# Patient Record
Sex: FEMALE | Race: WHITE | URBAN - METROPOLITAN AREA
[De-identification: names, ages, dates, MRNs, and addresses within clinical notes are randomized per-mention and may not be internally consistent; named-entity substitution may affect disease eponyms.]

---

## 2017-01-17 ENCOUNTER — OFFICE VISIT (OUTPATIENT)
Dept: CARDIOLOGY CLINIC | Age: 72
End: 2017-01-17

## 2017-01-17 VITALS
DIASTOLIC BLOOD PRESSURE: 44 MMHG | HEART RATE: 69 BPM | HEIGHT: 63 IN | BODY MASS INDEX: 34.2 KG/M2 | SYSTOLIC BLOOD PRESSURE: 118 MMHG | WEIGHT: 193 LBS

## 2017-01-17 DIAGNOSIS — R68.84 JAW PAIN: Primary | ICD-10-CM

## 2017-01-17 DIAGNOSIS — E78.5 HYPERLIPIDEMIA, UNSPECIFIED HYPERLIPIDEMIA TYPE: ICD-10-CM

## 2017-01-17 DIAGNOSIS — I20.8 ATYPICAL ANGINA (HCC): ICD-10-CM

## 2017-01-17 NOTE — PROGRESS NOTES
HISTORY OF PRESENT ILLNESS  Jw Rodriguez is a 66 y.o. female. New Patient   The history is provided by the patient. Associated symptoms include chest pain. Pertinent negatives include no abdominal pain, no headaches and no shortness of breath. Chest Pain    The history is provided by the patient. This is a new problem. The problem has not changed since onset. The problem occurs rarely. The pain is associated with rest, exertion and movement. The pain is present in the right side (back). The quality of the pain is described as dull. The pain radiates to the right jaw. Pertinent negatives include no abdominal pain, no claudication, no cough, no dizziness, no exertional chest pressure, no fever, no headaches, no hemoptysis, no nausea, no near-syncope, no orthopnea, no palpitations, no PND, no shortness of breath, no sputum production, no vomiting and no weakness. Jaw Pain   The history is provided by the patient. This is a recurrent problem. The problem occurs rarely. The problem has been resolved. Associated symptoms include chest pain. Pertinent negatives include no abdominal pain, no headaches and no shortness of breath. Nothing aggravates the symptoms. Nothing relieves the symptoms. Review of Systems   Constitutional: Negative for chills and fever. HENT: Negative for nosebleeds. Eyes: Negative for blurred vision and double vision. Respiratory: Negative for cough, hemoptysis, sputum production, shortness of breath and wheezing. Cardiovascular: Positive for chest pain. Negative for palpitations, orthopnea, claudication, leg swelling, PND and near-syncope. Gastrointestinal: Negative for abdominal pain, heartburn, nausea and vomiting. Musculoskeletal: Negative for myalgias. Skin: Negative for rash. Neurological: Negative for dizziness, weakness and headaches. Endo/Heme/Allergies: Does not bruise/bleed easily.      Family History   Problem Relation Age of Onset    Stroke Mother 80    Stroke Maternal Grandmother 95       Past Medical History   Diagnosis Date    Hyperlipidemia     Thyroid disease        History reviewed. No pertinent past surgical history. Social History   Substance Use Topics    Smoking status: Never Smoker    Smokeless tobacco: Not on file    Alcohol use Yes       Allergies   Allergen Reactions    Penicillins Rash       Prior to Admission medications    Medication Sig Start Date End Date Taking? Authorizing Provider   FENOFIBRATE NANOCRYSTALLIZED (TRICOR PO) Take  by mouth. Yes Historical Provider   LEVOTHYROXINE SODIUM (LEVOTHYROXINE PO) Take  by mouth. Yes Historical Provider         Visit Vitals    /44    Pulse 69    Ht 5' 3\" (1.6 m)    Wt 87.5 kg (193 lb)    BMI 34.19 kg/m2         Physical Exam   Constitutional: She is oriented to person, place, and time. She appears well-developed and well-nourished. HENT:   Head: Normocephalic and atraumatic. Eyes: Conjunctivae are normal.   Neck: Neck supple. No JVD present. No tracheal deviation present. No thyromegaly present. Cardiovascular: Normal rate and regular rhythm. PMI is not displaced. Exam reveals no gallop, no S3 and no decreased pulses. No murmur heard. Pulmonary/Chest: No respiratory distress. She has no wheezes. She has no rales. She exhibits no tenderness. Abdominal: Soft. There is no tenderness. Musculoskeletal: She exhibits no edema. Neurological: She is alert and oriented to person, place, and time. Skin: Skin is warm. Psychiatric: She has a normal mood and affect. Ms. Francesca Long has a reminder for a \"due or due soon\" health maintenance. I have asked that she contact her primary care provider for follow-up on this health maintenance. No flowsheet data found. Assessment         ICD-10-CM ICD-9-CM    1. Jaw pain R68.84 784.92 SCHEDULE NUCLEAR STUDY    recent possible angina   2. Atypical angina ([facility] [address] I20.8 413.9 SCHEDULE NUCLEAR STUDY    new onset     3. Hyperlipidemia, unspecified hyperlipidemia type E78.5 272.4     on treatment       There are no discontinued medications. Orders Placed This Encounter    SCHEDULE NUCLEAR STUDY     Exercise stress test     Standing Status:   Future     Standing Expiration Date:   1/17/2018       Follow-up Disposition:  Return for F/u after tests.

## 2017-01-17 NOTE — MR AVS SNAPSHOT
Visit Information Date & Time Provider Department Dept. Phone Encounter #  
 1/17/2017  9:15 AM Padma Ng MD [facility] [place] [phone] [id number] Follow-up Instructions Return for F/u after tests. Your Appointments 1/19/2017  7:30 AM  
PROCEDURE with [place] NUC [facility] [place] [facility] Appt Note: [address] [place] [address]  
  
   
 [address] [facility]  
  
    
 2/2/2017  9:00 AM  
Follow Up with Padma Ng MD  
[facility] [place] ([facility]) Appt Note: Post nuclear follow up  
 [address] [place] [address]  
  
   
 [address] [place] [id number] Upcoming Health Maintenance Date Due DTaP/Tdap/Td series (1 - Tdap) 1/21/1965 BREAST CANCER SCRN MAMMOGRAM 1/21/1994 FOBT Q 1 YEAR AGE 50-75 1/21/1994 ZOSTER VACCINE AGE 60> 1/21/2004 GLAUCOMA SCREENING Q2Y 1/21/2009 OSTEOPOROSIS SCREENING (DEXA) 1/21/2009 Pneumococcal 65+ Low/Medium Risk (1 of 2 - PCV13) 1/21/2009 MEDICARE YEARLY EXAM 1/21/2009 INFLUENZA AGE 9 TO ADULT 8/1/2016 Allergies as of 1/17/2017  Review Complete On: 1/17/2017 By: Padma Ng MD  
  
 Severity Noted Reaction Type Reactions Penicillins  01/17/2017    Rash Current Immunizations  Never Reviewed No immunizations on file. Not reviewed this visit You Were Diagnosed With   
  
 Codes Comments Jaw pain    -  Primary ICD-10-CM: R68.84 ICD-9-CM: 784.92 recent possible angina Atypical angina (HCC)     ICD-10-CM: I20.8 ICD-9-CM: 413.9 new onset Hyperlipidemia, unspecified hyperlipidemia type     ICD-10-CM: E78.5 ICD-9-CM: 272.4 on treatment Vitals BP Pulse Height(growth percentile) Weight(growth percentile) BMI Smoking Status 118/44 69 5' 3\" (1.6 m) 193 lb (87.5 kg) 34.19 kg/m2 Never Smoker Vitals History BMI and BSA Data Body Mass Index Body Surface Area  
 34.19 kg/m 2 1.97 m 2 Your Updated Medication List  
  
   
This list is accurate as of: 1/17/17  9:24 AM.  Always use your most recent med list.  
  
  
  
  
 LEVOTHYROXINE PO Take  by mouth. TRICOR PO Take  by mouth. Follow-up Instructions Return for F/u after tests. To-Do List   
 01/24/2017 Nursing:  SCHEDULE NUCLEAR STUDY Introducing [facility] & HEALTH SERVICES! Donna Cole introduces ClickOn patient portal. Now you can access parts of your medical record, email your doctor's office, and request medication refills online. 1. In your internet browser, go to [email or web address] 2. Click on the First Time User? Click Here link in the Sign In box. You will see the New Member Sign Up page. 3. Enter your ClickOn Access Code exactly as it appears below. You will not need to use this code after youve completed the sign-up process. If you do not sign up before the expiration date, you must request a new code. · ClickOn Access Code: [email or web address] Expires: 4/17/2017  8:42 AM 
 
4. Enter the last four digits of your Social Security Number (xxxx) and Date of Birth (mm/dd/yyyy) as indicated and click Submit. You will be taken to the next sign-up page. 5. Create a ClickOn ID. This will be your ClickOn login ID and cannot be changed, so think of one that is secure and easy to remember. 6. Create a ClickOn password. You can change your password at any time. 7. Enter your Password Reset Question and Answer. This can be used at a later time if you forget your password. 8. Enter your e-mail address. You will receive e-mail notification when new information is available in [address] 9. Click Sign Up. You can now view and download portions of your medical record. 10. Click the Download Summary menu link to download a portable copy of your medical information. If you have questions, please visit the Frequently Asked Questions section of the [facility] website. Remember, [facility] is NOT to be used for urgent needs. For medical emergencies, dial 911. Now available from your iPhone and Android! Please provide this summary of care documentation to your next provider. Your primary care clinician is listed as [address] [place] [address]. If you have any questions after today's visit, please call [phone].

## 2017-01-17 NOTE — LETTER
Palmira Ugarte 1945 1/17/2017 Dear Daniela Huizar NP I had the pleasure of evaluating  Ms. Zara Primrose in office today. Below are the relevant portions of my assessment and plan of care. ICD-10-CM ICD-9-CM 1. Jaw pain R68.84 784.92 SCHEDULE NUCLEAR STUDY  
 recent possible angina 2. Atypical angina (HCC) I20.8 413.9 SCHEDULE NUCLEAR STUDY  
 new onset 3. Hyperlipidemia, unspecified hyperlipidemia type E78.5 272.4   
 on treatment Current Outpatient Prescriptions Medication Sig Dispense Refill  FENOFIBRATE NANOCRYSTALLIZED (TRICOR PO) Take  by mouth.  LEVOTHYROXINE SODIUM (LEVOTHYROXINE PO) Take  by mouth. Orders Placed This Encounter  SCHEDULE NUCLEAR STUDY Exercise stress test  
  Standing Status:   Future Standing Expiration Date:   1/17/2018  FENOFIBRATE NANOCRYSTALLIZED (TRICOR PO) Sig: Take  by mouth.  LEVOTHYROXINE SODIUM (LEVOTHYROXINE PO) Sig: Take  by mouth. If you have questions, please do not hesitate to call me. I look forward to following Ms. Zara Primrose along with you. Sincerely, Yuval Underwood MD

## 2017-01-19 ENCOUNTER — CLINICAL SUPPORT (OUTPATIENT)
Dept: CARDIOLOGY CLINIC | Age: 72
End: 2017-01-19

## 2017-01-19 DIAGNOSIS — I20.8 ATYPICAL ANGINA (HCC): ICD-10-CM

## 2017-01-19 DIAGNOSIS — R68.84 JAW PAIN: Primary | ICD-10-CM

## 2017-01-19 DIAGNOSIS — E78.5 HYPERLIPIDEMIA, UNSPECIFIED HYPERLIPIDEMIA TYPE: ICD-10-CM

## 2017-01-19 NOTE — PROGRESS NOTES
[facility]  [address], [address] [place] [address], [place], [address] [place] [address]  [phone] [phone]      Name: Kitty Caldera         MRN#: [id number]      Gender: female       YOB: 1945             Date of Rest/Stress Images: 1/19/2017   Referring Provider: Lizette Jones NP  Ordering Provider: Chen Caldera. Harry Mariscal MD, [facility]  Technologist: Karmen Aguilera. Shamar RESTREPO, C.N.M.T. Diagnosis:   1. Jaw pain    2. Atypical angina ([address]    3. Hyperlipidemia, unspecified hyperlipidemia type            Rest/Stress Myoview SPECT Myocardial Perfusion Imaging with  Exercise Stress and Gated SPECT Imaging      PROCEDURE:      Myocardial perfusion imaging was performed at rest approximately 30 mins following the intravenous injection,(Right hand ) of 11.7 mCi of Tc99m Myoview for evaluation of myocardial function and perfusion at rest.     Baseline Data:    Baseline EKG reveals sinus rhythm, possible old anterior MI. Baseline heart rate is 61. Baseline blood pressure is 138/82. Procedure: The patient was exercised using the standard Edward protocol for 5 minutes, 15 seconds. Exercise was stopped because of fatigue. The patient had no chest pain. Heart rate increased from baseline to a heart rate of 142, achieving 95% of the maximum predicted heart rate. Blood pressure response was appropriate. Peak blood pressure was 152/90. Electrocardiogram showed no significant ST-T changes or arrhythmia during the procedure. Diagnosis:   1. Negative EKG portion of exercise stress test at 95% of the maximum predicted heart rate. 2. Reduced functional tolerance. 3. No symptoms of angina. 4. Nuclear imaging report to follow. Exercise: At peak exercise, the patient was injected intravenously with 37.0 mCi of Tc99m Myoview and exercise was continued for 15 to 45 seconds.  The stress images were obtained approximately 30 minutes post tracer injection. The stress SPECT study was gated to evaluate regional wall motion and calculate the left ventricular ejection fraction. The data was reconstructed in the short, horizontal long and vertical long axis views and tomographic slices were generated. NUCLEAR IMAGING:     Findings:   1. Stress images reveal normal Myoview distrubution in all the LV segments in short axis, vertical and horizontal long axis views. 2. Resting images have a normal uptake. 3. Gated images reveal normal wall motion and the ejection fraction is calculated to be 90%. Conclusion:   1. Normal perfusion scan. 2. Normal wall motion and ejection fraction. 3. Low risk scan. Thank you for the referral.    E-signed and Interpreting Physician:    Fercho Colon.  Tanja Dougherty MD, [facility]     Date of interpretation: 1/19/2017  Date of final report: 1/19/2017

## 2017-02-14 ENCOUNTER — OFFICE VISIT (OUTPATIENT)
Dept: CARDIOLOGY CLINIC | Age: 72
End: 2017-02-14

## 2017-02-14 VITALS
HEART RATE: 75 BPM | HEIGHT: 63 IN | SYSTOLIC BLOOD PRESSURE: 122 MMHG | DIASTOLIC BLOOD PRESSURE: 71 MMHG | WEIGHT: 192 LBS | BODY MASS INDEX: 34.02 KG/M2

## 2017-02-14 DIAGNOSIS — R68.84 JAW PAIN: Primary | ICD-10-CM

## 2017-02-14 DIAGNOSIS — I20.8 ATYPICAL ANGINA (HCC): ICD-10-CM

## 2017-02-14 NOTE — PROGRESS NOTES
1. Have you been to the ER, urgent care clinic since your last visit? Hospitalized since your last visit? No    2. Have you seen or consulted any other health care providers outside of the [address] [place] [address] since your last visit? Include any pap smears or colon screening. No     3. Since your last visit, have you had any of the following symptoms? .           4. Have you had any blood work, X-rays or cardiac testing? No              5.  Where do you normally have your labs drawn? PCP Office    6. Do you need any refills today?    No

## 2017-02-14 NOTE — LETTER
Irlanda Pierre 1945 2/14/2017 Dear Mary Manrique NP I had the pleasure of evaluating  Ms. Marcia Caal in office today. Below are the relevant portions of my assessment and plan of care. ICD-10-CM ICD-9-CM 1. Jaw pain R68.84 784.92   
 unclear etiology 2. 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   
 negative est 
monitor clinically Current Outpatient Prescriptions Medication Sig Dispense Refill  FENOFIBRATE NANOCRYSTALLIZED (TRICOR PO) Take  by mouth.  LEVOTHYROXINE SODIUM (LEVOTHYROXINE PO) Take  by mouth. No orders of the defined types were placed in this encounter. If you have questions, please do not hesitate to call me. I look forward to following Ms. Marcia Caal along with you. Sincerely, Evelyn Hackett MD

## 2017-02-14 NOTE — MR AVS SNAPSHOT
Visit Information Date & Time Provider Department Dept. Phone Encounter #  
 2/14/2017  1:30 PM Teetee Avila MD [facility] [phone] [id number] Follow-up Instructions Return in about 1 year (around 2/14/2018). Upcoming Health Maintenance Date Due DTaP/Tdap/Td series (1 - Tdap) 1/21/1965 BREAST CANCER SCRN MAMMOGRAM 1/21/1994 FOBT Q 1 YEAR AGE 50-75 1/21/1994 ZOSTER VACCINE AGE 60> 1/21/2004 GLAUCOMA SCREENING Q2Y 1/21/2009 OSTEOPOROSIS SCREENING (DEXA) 1/21/2009 Pneumococcal 65+ Low/Medium Risk (1 of 2 - PCV13) 1/21/2009 MEDICARE YEARLY EXAM 1/21/2009 INFLUENZA AGE 9 TO ADULT 8/1/2016 Allergies as of 2/14/2017  Review Complete On: 2/14/2017 By: Teetee Avila MD  
  
 Severity Noted Reaction Type Reactions Penicillins  01/17/2017    Rash Current Immunizations  Never Reviewed No immunizations on file. Not reviewed this visit You Were Diagnosed With   
  
 Codes Comments Jaw pain    -  Primary ICD-10-CM: R68.84 ICD-9-CM: 784.92 unclear etiology Atypical angina (HCC)     ICD-10-CM: I20.8 ICD-9-CM: 413.9 negative est 
monitor clinically Vitals BP Pulse Height(growth percentile) Weight(growth percentile) BMI Smoking Status 122/71 75 5' 3\" (1.6 m) 192 lb (87.1 kg) 34.01 kg/m2 Never Smoker Vitals History BMI and BSA Data Body Mass Index Body Surface Area 34.01 kg/m 2 1.97 m 2 Your Updated Medication List  
  
   
This list is accurate as of: 2/14/17  2:01 PM.  Always use your most recent med list.  
  
  
  
  
 LEVOTHYROXINE PO Take  by mouth. TRICOR PO Take  by mouth. Follow-up Instructions Return in about 1 year (around 2/14/2018). Introducing [facility] & HEALTH SERVICES! Dear Herbert Zarate: 
Thank you for requesting a bluebird bio account. Our records indicate that you already have an active bluebird bio account.   You can access your account anytime at [email or web address] Did you know that you can access your hospital and ER discharge instructions at any time in [place]? You can also review all of your test results from your hospital stay or ER visit. Additional Information If you have questions, please visit the Frequently Asked Questions section of the [facility] website at [email or web address] Remember, [facility] is NOT to be used for urgent needs. For medical emergencies, dial 911. Now available from your iPhone and Android! Please provide this summary of care documentation to your next provider. Your primary care clinician is listed as [address] [place] [address]. If you have any questions after today's visit, please call [phone].

## 2017-02-14 NOTE — PROGRESS NOTES
HISTORY OF PRESENT ILLNESS  Sinai Nicole is a 67 y.o. female. HPI Comments: Patient is here for follow up of diagnostic tests. Results will be discussed. Chest Pain (Angina)    The history is provided by the patient. This is a new problem. The problem has not changed since onset. The problem occurs rarely. The pain is associated with rest, exertion and movement. The pain is present in the right side (back). The quality of the pain is described as dull. The pain radiates to the right jaw. Pertinent negatives include no abdominal pain, no claudication, no cough, no dizziness, no exertional chest pressure, no fever, no headaches, no hemoptysis, no nausea, no near-syncope, no orthopnea, no palpitations, no PND, no shortness of breath, no sputum production, no vomiting and no weakness. Jaw Pain   The history is provided by the patient. This is a recurrent problem. The problem occurs rarely. The problem has been resolved. Associated symptoms include chest pain. Pertinent negatives include no abdominal pain, no headaches and no shortness of breath. Nothing aggravates the symptoms. Nothing relieves the symptoms. Review of Systems   Constitutional: Negative for chills and fever. HENT: Negative for nosebleeds. Eyes: Negative for blurred vision and double vision. Respiratory: Negative for cough, hemoptysis, sputum production, shortness of breath and wheezing. Cardiovascular: Positive for chest pain. Negative for palpitations, orthopnea, claudication, leg swelling, PND and near-syncope. Gastrointestinal: Negative for abdominal pain, heartburn, nausea and vomiting. Musculoskeletal: Negative for myalgias. Skin: Negative for rash. Neurological: Negative for dizziness, weakness and headaches. Endo/Heme/Allergies: Does not bruise/bleed easily.      Family History   Problem Relation Age of Onset    Stroke Mother 80    Stroke Maternal Grandmother 80       Past Medical History   Diagnosis Date    Hyperlipidemia     Thyroid disease        No past surgical history on file. Social History   Substance Use Topics    Smoking status: Never Smoker    Smokeless tobacco: Not on file    Alcohol use Yes       Allergies   Allergen Reactions    Penicillins Rash       Prior to Admission medications    Medication Sig Start Date End Date Taking? Authorizing Provider   FENOFIBRATE NANOCRYSTALLIZED (TRICOR PO) Take  by mouth. Yes Historical Provider   LEVOTHYROXINE SODIUM (LEVOTHYROXINE PO) Take  by mouth. Yes Historical Provider         Visit Vitals    /71    Pulse 75    Ht 5' 3\" (1.6 m)    Wt 87.1 kg (192 lb)    BMI 34.01 kg/m2         Physical Exam   Constitutional: She is oriented to person, place, and time. She appears well-developed and well-nourished. HENT:   Head: Normocephalic and atraumatic. Eyes: Conjunctivae are normal.   Neck: Neck supple. No JVD present. No tracheal deviation present. No thyromegaly present. Cardiovascular: Normal rate and regular rhythm. PMI is not displaced. Exam reveals no gallop, no S3 and no decreased pulses. No murmur heard. Pulmonary/Chest: No respiratory distress. She has no wheezes. She has no rales. She exhibits no tenderness. Abdominal: Soft. There is no tenderness. Musculoskeletal: She exhibits no edema. Neurological: She is alert and oriented to person, place, and time. Skin: Skin is warm. Psychiatric: She has a normal mood and affect. Ms. Lucendia Frankel has a reminder for a \"due or due soon\" health maintenance. I have asked that she contact her primary care provider for follow-up on this health maintenance. NUCLEAR IMAGIN2017     Findings:   1. Stress images reveal normal Myoview distrubution in all the LV segments in short axis, vertical and horizontal long axis views. 2. Resting images have a normal uptake. 3. Gated images reveal normal wall motion and the ejection fraction is calculated to be 90%. Conclusion:   1.  Normal perfusion scan. 2. Normal wall motion and ejection fraction. 3. Low risk scan. Assessment         ICD-10-CM ICD-9-CM    1. Jaw pain R68.84 784.92     unclear etiology   2. Atypical angina (HCC) I20.8 413.9     negative est  monitor clinically       There are no discontinued medications. No orders of the defined types were placed in this encounter. Follow-up Disposition:  Return in about 1 year (around 2/14/2018).

## 2018-02-15 ENCOUNTER — OFFICE VISIT (OUTPATIENT)
Dept: CARDIOLOGY CLINIC | Age: 73
End: 2018-02-15

## 2018-02-15 VITALS
HEIGHT: 63 IN | DIASTOLIC BLOOD PRESSURE: 70 MMHG | WEIGHT: 192 LBS | BODY MASS INDEX: 34.02 KG/M2 | HEART RATE: 67 BPM | SYSTOLIC BLOOD PRESSURE: 123 MMHG

## 2018-02-15 DIAGNOSIS — E78.5 HYPERLIPIDEMIA, UNSPECIFIED HYPERLIPIDEMIA TYPE: ICD-10-CM

## 2018-02-15 DIAGNOSIS — I20.8 ATYPICAL ANGINA (HCC): Primary | ICD-10-CM

## 2018-02-15 DIAGNOSIS — E03.9 ACQUIRED HYPOTHYROIDISM: ICD-10-CM

## 2018-02-15 NOTE — PROGRESS NOTES
1. Have you been to the ER, urgent care clinic since your last visit? Hospitalized since your last visit? No    2. Have you seen or consulted any other health care providers outside of the [address] [place] [address] since your last visit? Include any pap smears or colon screening.  Yes, pcp

## 2018-02-15 NOTE — MR AVS SNAPSHOT
[address] [facility] 
 
 
 [address] [facility] 
[phone] Patient: Ruby Ellis MRN: [id number] YYT:[birth year] Visit Information Date & Time Provider Department Dept. Phone Encounter #  
 2/15/2018 11:00 AM Jefferson Cameron MD [facility] [place] [phone] Follow-up Instructions Return in about 1 year (around 2/15/2019). Upcoming Health Maintenance Date Due DTaP/Tdap/Td series (1 - Tdap) 1/21/1965 BREAST CANCER SCRN MAMMOGRAM 1/21/1994 FOBT Q 1 YEAR AGE 50-75 1/21/1994 ZOSTER VACCINE AGE 60> 11/21/2003 GLAUCOMA SCREENING Q2Y 1/21/2009 OSTEOPOROSIS SCREENING (DEXA) 1/21/2009 Pneumococcal 65+ Low/Medium Risk (1 of 2 - PCV13) 1/21/2009 MEDICARE YEARLY EXAM 1/21/2009 Influenza Age 5 to Adult 8/1/2017 Allergies as of 2/15/2018  Review Complete On: 2/15/2018 By: Jefferson Cameron MD  
  
 Severity Noted Reaction Type Reactions Penicillins  01/17/2017    Rash Current Immunizations  Never Reviewed No immunizations on file. Not reviewed this visit You Were Diagnosed With   
  
 Codes Comments Atypical angina (HCC)    -  Primary ICD-10-CM: I20.8 ICD-9-CM: 413.9 stable 
no angina 
exertional sob stable Hyperlipidemia, unspecified hyperlipidemia type     ICD-10-CM: E78.5 ICD-9-CM: 272.4 on fibrate 
lipids with pcp Acquired hypothyroidism     ICD-10-CM: E03.9 ICD-9-CM: 244.9 on synthroid Vitals BP Pulse Height(growth percentile) Weight(growth percentile) BMI OB Status 123/70 67 5' 3\" (1.6 m) 192 lb (87.1 kg) 34.01 kg/m2 Menopause Smoking Status Never Smoker Vitals History BMI and BSA Data Body Mass Index Body Surface Area 34.01 kg/m 2 1.97 m 2 Your Updated Medication List  
  
   
This list is accurate as of: 2/15/18 11:13 AM.  Always use your most recent med list.  
  
  
  
  
 LEVOTHYROXINE PO  
 Take  by mouth. TRICOR PO Take  by mouth. Follow-up Instructions Return in about 1 year (around 2/15/2019). Introducing [facility]! Dear Betsy Fulton: 
Thank you for requesting a [employer] account. Our records indicate that you already have an active [employer] account. You can access your account anytime at [email or web address] Did you know that you can access your hospital and ER discharge instructions at any time in [place]? You can also review all of your test results from your hospital stay or ER visit. Additional Information If you have questions, please visit the Frequently Asked Questions section of the [employer] website at [email or web address] Remember, [facility] is NOT to be used for urgent needs. For medical emergencies, dial 911. Now available from your iPhone and Android! Please provide this summary of care documentation to your next provider. Your primary care clinician is listed as Carolyn Funk. If you have any questions after today's visit, please call [phone].

## 2018-02-15 NOTE — PROGRESS NOTES
HISTORY OF PRESENT ILLNESS  Abby Browning is a 75 y.o. female. Cholesterol Problem   The history is provided by the patient. This is a chronic problem. The problem occurs constantly. The problem has not changed since onset. Pertinent negatives include no chest pain, no abdominal pain, no headaches and no shortness of breath. Chest Pain (Angina)    The history is provided by the patient. This is a new problem. The problem has been resolved. The problem occurs rarely. The pain is present in the right side (back). The quality of the pain is described as dull. The pain radiates to the right jaw. Pertinent negatives include no abdominal pain, no claudication, no cough, no dizziness, no exertional chest pressure, no fever, no headaches, no hemoptysis, no nausea, no near-syncope, no orthopnea, no palpitations, no PND, no shortness of breath, no sputum production, no vomiting and no weakness. Jaw Pain   The history is provided by the patient. This is a recurrent problem. The problem occurs rarely. The problem has been resolved. Pertinent negatives include no chest pain, no abdominal pain, no headaches and no shortness of breath. Nothing aggravates the symptoms. Nothing relieves the symptoms. Review of Systems   Constitutional: Negative for chills and fever. HENT: Negative for nosebleeds. Eyes: Negative for blurred vision and double vision. Respiratory: Negative for cough, hemoptysis, sputum production, shortness of breath and wheezing. Cardiovascular: Negative for chest pain, palpitations, orthopnea, claudication, leg swelling, PND and near-syncope. Gastrointestinal: Negative for abdominal pain, heartburn, nausea and vomiting. Musculoskeletal: Negative for myalgias. Skin: Negative for rash. Neurological: Negative for dizziness, weakness and headaches. Endo/Heme/Allergies: Does not bruise/bleed easily.      Family History   Problem Relation Age of Onset    Stroke Mother 80    Stroke Maternal Grandmother 95       Past Medical History:   Diagnosis Date    Hyperlipidemia     Thyroid disease        No past surgical history on file. Social History   Substance Use Topics    Smoking status: Never Smoker    Smokeless tobacco: Never Used    Alcohol use No       Allergies   Allergen Reactions    Penicillins Rash       Prior to Admission medications    Medication Sig Start Date End Date Taking? Authorizing Provider   FENOFIBRATE NANOCRYSTALLIZED (TRICOR PO) Take  by mouth. Yes Historical Provider   LEVOTHYROXINE SODIUM (LEVOTHYROXINE PO) Take  by mouth. Yes Historical Provider         Visit Vitals    /70    Pulse 67    Ht 5' 3\" (1.6 m)    Wt 87.1 kg (192 lb)    BMI 34.01 kg/m2         Physical Exam   Constitutional: She is oriented to person, place, and time. She appears well-developed and well-nourished. HENT:   Head: Normocephalic and atraumatic. Eyes: Conjunctivae are normal.   Neck: Neck supple. No JVD present. No tracheal deviation present. No thyromegaly present. Cardiovascular: Normal rate and regular rhythm. PMI is not displaced. Exam reveals no gallop, no S3 and no decreased pulses. No murmur heard. Pulmonary/Chest: No respiratory distress. She has no wheezes. She has no rales. She exhibits no tenderness. Abdominal: Soft. There is no tenderness. Musculoskeletal: She exhibits no edema. Neurological: She is alert and oriented to person, place, and time. Skin: Skin is warm. Psychiatric: She has a normal mood and affect. Ms. Kayley Bay has a reminder for a \"due or due soon\" health maintenance. I have asked that she contact her primary care provider for follow-up on this health maintenance. NUCLEAR [id number]     Findings:   1. Stress images reveal normal Myoview distrubution in all the LV segments in short axis, vertical and horizontal long axis views. 2. Resting images have a normal uptake.    3. Gated images reveal normal wall motion and the ejection fraction is calculated to be 90%. Conclusion:   1. Normal perfusion scan. 2. Normal wall motion and ejection fraction. 3. Low risk scan. I Have personally reviewed recent relevant labs available and discussed with patient  1/2018    Assessment         ICD-10-CM ICD-9-CM    1. 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 Hyperlipidemia, unspecified hyperlipidemia type E78.5 272.4     on fibrate  lipids with pcp   3. Acquired hypothyroidism E03.9 244.9     on synthroid       There are no discontinued medications. No orders of the defined types were placed in this encounter. Follow-up Disposition:  Return in about 1 year (around 2/15/2019).

## 2018-02-15 NOTE — LETTER
Tamiko Field 1945 2/15/2018 Dear Jake Ybarra, NP I had the pleasure of evaluating  Ms. Geraldine Montenegro in office today. Below are the relevant portions of my assessment and plan of care. ICD-10-CM ICD-9-CM 1. 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   
 stable 
no angina 
exertional sob stable 2. Hyperlipidemia, unspecified hyperlipidemia type E78.5 272.4   
 on fibrate 
lipids with pcp 3. Acquired hypothyroidism E03.9 244.9   
 on synthroid Current Outpatient Prescriptions Medication Sig Dispense Refill  FENOFIBRATE NANOCRYSTALLIZED (TRICOR PO) Take  by mouth.  LEVOTHYROXINE SODIUM (LEVOTHYROXINE PO) Take  by mouth. No orders of the defined types were placed in this encounter. If you have questions, please do not hesitate to call me. I look forward to following Ms. Geraldine Montenegro along with you. Sincerely, Jesus Zhang MD

## 2019-02-11 ENCOUNTER — OFFICE VISIT (OUTPATIENT)
Dept: CARDIOLOGY CLINIC | Age: 74
End: 2019-02-11

## 2019-02-11 VITALS
SYSTOLIC BLOOD PRESSURE: 120 MMHG | HEIGHT: 63 IN | BODY MASS INDEX: 33.31 KG/M2 | DIASTOLIC BLOOD PRESSURE: 74 MMHG | HEART RATE: 91 BPM | WEIGHT: 188 LBS

## 2019-02-11 DIAGNOSIS — E78.5 HYPERLIPIDEMIA, UNSPECIFIED HYPERLIPIDEMIA TYPE: ICD-10-CM

## 2019-02-11 DIAGNOSIS — I20.8 ATYPICAL ANGINA (HCC): Primary | ICD-10-CM

## 2019-02-11 DIAGNOSIS — E03.9 ACQUIRED HYPOTHYROIDISM: ICD-10-CM

## 2019-02-11 NOTE — PATIENT INSTRUCTIONS

## 2019-02-11 NOTE — LETTER
Kimberly Zuluaga 1945 2/11/2019 Dear Usha Tomlinson NP I had the pleasure of evaluating  Ms. Saulo Hurst in office today. Below are the relevant portions of my assessment and plan of care. ICD-10-CM ICD-9-CM 1. 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 Hyperlipidemia, unspecified hyperlipidemia type E78.5 272.4 Stable - lipids with PCP 3. Acquired hypothyroidism E03.9 244.9 TSH 2.69 - continue synthroid, management with PCP Current Outpatient Medications Medication Sig Dispense Refill  FENOFIBRATE NANOCRYSTALLIZED (TRICOR PO) Take  by mouth.  LEVOTHYROXINE SODIUM (LEVOTHYROXINE PO) Take  by mouth. No orders of the defined types were placed in this encounter. If you have questions, please do not hesitate to call me. I look forward to following Ms. Saulo Hurst along with you. Sincerely, Fadumo Anderson MD

## 2019-02-11 NOTE — PROGRESS NOTES
1. Have you been to the ER, urgent care clinic since your last visit? Hospitalized since your last visit? No     2. Have you seen or consulted any other health care providers outside of the [address] [place] [address] since your last visit? Include any pap smears or colon screening. No     3. Since your last visit, have you had any of the following symptoms? 4. Have you had any blood work, X-rays or cardiac testing? 5. Where do you normally have your labs drawn? 6. Do you need any refills today?

## 2019-02-11 NOTE — PROGRESS NOTES
HISTORY OF PRESENT ILLNESS  Sriram Larry is a 75 y.o. female. Cholesterol Problem   The history is provided by the patient. This is a chronic problem. The problem occurs constantly. The problem has not changed since onset. Pertinent negatives include no chest pain, no abdominal pain, no headaches and no shortness of breath. Chest Pain (Angina)    The history is provided by the patient. This is a new problem. The problem has been resolved. The problem occurs rarely. The pain is present in the right side (back). The quality of the pain is described as dull. The pain radiates to the right jaw. Pertinent negatives include no abdominal pain, no claudication, no cough, no dizziness, no exertional chest pressure, no fever, no headaches, no hemoptysis, no nausea, no near-syncope, no orthopnea, no palpitations, no PND, no shortness of breath, no sputum production, no vomiting and no weakness. Jaw Pain   The history is provided by the patient. This is a recurrent problem. The problem occurs rarely. The problem has been resolved. Pertinent negatives include no chest pain, no abdominal pain, no headaches and no shortness of breath. Nothing aggravates the symptoms. Nothing relieves the symptoms. Review of Systems   Constitutional: Negative for chills and fever. HENT: Negative for nosebleeds. Eyes: Negative for blurred vision and double vision. Respiratory: Negative for cough, hemoptysis, sputum production, shortness of breath and wheezing. Cardiovascular: Negative for chest pain, palpitations, orthopnea, claudication, leg swelling, PND and near-syncope. Gastrointestinal: Negative for abdominal pain, heartburn, nausea and vomiting. Musculoskeletal: Negative for myalgias. Skin: Negative for rash. Neurological: Negative for dizziness, weakness and headaches. Endo/Heme/Allergies: Does not bruise/bleed easily.      Family History   Problem Relation Age of Onset    Stroke Mother 80    Stroke Maternal Grandmother 95       Past Medical History:   Diagnosis Date    Hyperlipidemia     Thyroid disease        No past surgical history on file. Social History     Tobacco Use    Smoking status: Never Smoker    Smokeless tobacco: Never Used   Substance Use Topics    Alcohol use: No       Allergies   Allergen Reactions    Penicillins Rash       Prior to Admission medications    Medication Sig Start Date End Date Taking? Authorizing Provider   FENOFIBRATE NANOCRYSTALLIZED (TRICOR PO) Take  by mouth. Yes Provider, Historical   LEVOTHYROXINE SODIUM (LEVOTHYROXINE PO) Take  by mouth. Yes Provider, Historical         Visit Vitals  /74   Pulse 91   Ht 5' 3\" (1.6 m)   Wt 85.3 kg (188 lb)   BMI 33.30 kg/m²       Cholesterol 132 110 - 200 mg/dL   Triglyceride 111 40 - 149 mg/dL   HDL 40 40 - 59 mg/dL   Cholesterol/HDL 3.3 0.0 - 5.0    LDL CALCULATION 70 50 - 99 mg/dL   VLDL CALCULATION 22 8 - 30 mg/dL   TSH 2.69    Physical Exam   Constitutional: She is oriented to person, place, and time. She appears well-developed and well-nourished. HENT:   Head: Normocephalic and atraumatic. Eyes: Conjunctivae are normal.   Neck: Neck supple. No JVD present. No tracheal deviation present. No thyromegaly present. Cardiovascular: Normal rate and regular rhythm. PMI is not displaced. Exam reveals no gallop, no S3 and no decreased pulses. No murmur heard. Pulmonary/Chest: No respiratory distress. She has no wheezes. She has no rales. She exhibits no tenderness. Abdominal: Soft. There is no tenderness. Musculoskeletal: She exhibits no edema. Neurological: She is alert and oriented to person, place, and time. Skin: Skin is warm. Psychiatric: She has a normal mood and affect. Ms. Sulaiman Alexis has a reminder for a \"due or due soon\" health maintenance. I have asked that she contact her primary care provider for follow-up on this health maintenance. NUCLEAR [email or web address]     Findings:   1.  Stress images reveal normal Myoview distrubution in all the LV segments in short axis, vertical and horizontal long axis views. 2. Resting images have a normal uptake. 3. Gated images reveal normal wall motion and the ejection fraction is calculated to be 90%. Conclusion:   1. Normal perfusion scan. 2. Normal wall motion and ejection fraction. 3. Low risk scan. I Have personally reviewed recent relevant labs available and discussed with patient  1/2018    Assessment         ICD-10-CM ICD-9-CM    1. 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 Hyperlipidemia, unspecified hyperlipidemia type E78.5 272.4     Stable - lipids with PCP   3. Acquired hypothyroidism E03.9 244.9     TSH 2.69 - continue synthroid, management with PCP     2/11/2019 - Denies chest pain, sob, orthopnea, edema. Recent labs reviewed. Stable. There are no discontinued medications. No orders of the defined types were placed in this encounter. Follow-up Disposition:  Return in about 1 year (around 2/11/2020), or if symptoms worsen or fail to improve.

## 2020-02-10 ENCOUNTER — OFFICE VISIT (OUTPATIENT)
Dept: CARDIOLOGY CLINIC | Age: 75
End: 2020-02-10

## 2020-02-10 VITALS
BODY MASS INDEX: 32.6 KG/M2 | HEIGHT: 63 IN | SYSTOLIC BLOOD PRESSURE: 144 MMHG | HEART RATE: 76 BPM | WEIGHT: 184 LBS | DIASTOLIC BLOOD PRESSURE: 87 MMHG

## 2020-02-10 DIAGNOSIS — I20.8 ATYPICAL ANGINA (HCC): Primary | ICD-10-CM

## 2020-02-10 DIAGNOSIS — E78.5 HYPERLIPIDEMIA, UNSPECIFIED HYPERLIPIDEMIA TYPE: ICD-10-CM

## 2020-02-10 DIAGNOSIS — E03.9 ACQUIRED HYPOTHYROIDISM: ICD-10-CM

## 2020-02-10 NOTE — PROGRESS NOTES
HISTORY OF PRESENT ILLNESS  Oziel Maria is a 67 y.o. female. Cholesterol Problem   The history is provided by the patient. This is a chronic problem. The problem occurs constantly. The problem has not changed since onset. Pertinent negatives include no chest pain, no abdominal pain, no headaches and no shortness of breath. Chest Pain (Angina)    The history is provided by the patient. This is a new problem. The problem has been resolved. The problem occurs rarely. The pain is present in the right side (back). The quality of the pain is described as dull. The pain radiates to the right jaw. Pertinent negatives include no abdominal pain, no claudication, no cough, no dizziness, no exertional chest pressure, no fever, no headaches, no hemoptysis, no nausea, no near-syncope, no orthopnea, no palpitations, no PND, no shortness of breath, no sputum production, no vomiting and no weakness. Jaw Pain   The history is provided by the patient. This is a recurrent problem. The problem occurs rarely. The problem has been resolved. Pertinent negatives include no chest pain, no abdominal pain, no headaches and no shortness of breath. Nothing aggravates the symptoms. Nothing relieves the symptoms. Review of Systems   Constitutional: Negative for chills and fever. HENT: Negative for nosebleeds. Eyes: Negative for blurred vision and double vision. Respiratory: Negative for cough, hemoptysis, sputum production, shortness of breath and wheezing. Cardiovascular: Negative for chest pain, palpitations, orthopnea, claudication, leg swelling, PND and near-syncope. Gastrointestinal: Negative for abdominal pain, heartburn, nausea and vomiting. Musculoskeletal: Negative for myalgias. Skin: Negative for rash. Neurological: Negative for dizziness, weakness and headaches. Endo/Heme/Allergies: Does not bruise/bleed easily.      Family History   Problem Relation Age of Onset    Stroke Mother 80    Stroke Maternal Grandmother 95       Past Medical History:   Diagnosis Date    Hyperlipidemia     Thyroid disease        History reviewed. No pertinent surgical history. Social History     Tobacco Use    Smoking status: Never Smoker    Smokeless tobacco: Never Used   Substance Use Topics    Alcohol use: No       Allergies   Allergen Reactions    Penicillins Rash    Statins-Hmg-Coa Reductase Inhibitors Myalgia       Prior to Admission medications    Medication Sig Start Date End Date Taking? Authorizing Provider   FENOFIBRATE NANOCRYSTALLIZED (TRICOR PO) Take 145 mg by mouth. Yes Provider, Historical   LEVOTHYROXINE SODIUM (LEVOTHYROXINE PO) Take 88 mg by mouth. Yes Provider, Historical         Visit Vitals  /87   Pulse 76   Ht 5' 3\" (1.6 m)   Wt 83.5 kg (184 lb)   BMI 32.59 kg/m²       Cholesterol 132 110 - 200 mg/dL   Triglyceride 111 40 - 149 mg/dL   HDL 40 40 - 59 mg/dL   Cholesterol/HDL 3.3 0.0 - 5.0    LDL CALCULATION 70 50 - 99 mg/dL   VLDL CALCULATION 22 8 - 30 mg/dL   TSH 2.69    Physical Exam   Constitutional: She is oriented to person, place, and time. She appears well-developed and well-nourished. HENT:   Head: Normocephalic and atraumatic. Eyes: Conjunctivae are normal.   Neck: Neck supple. No JVD present. No tracheal deviation present. No thyromegaly present. Cardiovascular: Normal rate and regular rhythm. PMI is not displaced. Exam reveals no gallop, no S3 and no decreased pulses. No murmur heard. Pulmonary/Chest: No respiratory distress. She has no wheezes. She has no rales. She exhibits no tenderness. Abdominal: Soft. There is no abdominal tenderness. Musculoskeletal:         General: No edema. Neurological: She is alert and oriented to person, place, and time. Skin: Skin is warm. Psychiatric: She has a normal mood and affect. Ms. Sindy Gonzales has a reminder for a \"due or due soon\" health maintenance.  I have asked that she contact her primary care provider for follow-up on this health maintenance. NUCLEAR [email or web address]     Findings:   1. Stress images reveal normal Myoview distrubution in all the LV segments in short axis, vertical and horizontal long axis views. 2. Resting images have a normal uptake. 3. Gated images reveal normal wall motion and the ejection fraction is calculated to be 90%. Conclusion:   1. Normal perfusion scan. 2. Normal wall motion and ejection fraction. 3. Low risk scan. I Have personally reviewed recent relevant labs available and discussed with patient  2018    Assessment         ICD-10-CM ICD-9-CM    1. 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 Continue monitoring. Not required any medication in past year. 2. Hyperlipidemia, unspecified hyperlipidemia type E78.5 272.4     Currently on fibroids. Intolerant to statin in past.  Follow-up lab with PCP   3. Acquired hypothyroidism E03.9 244.9     Continue treatment stable lab with PCP     2019 - Denies chest pain, sob, orthopnea, edema. Recent labs reviewed. Stable. 2020  Cardiac status stable. Occasional episode of atypical angina. Not required any medication. Continue therapy  There are no discontinued medications. No orders of the defined types were placed in this encounter. Follow-up and Dispositions    · Return in about 1 year (around 2/10/2021).

## 2020-02-10 NOTE — PROGRESS NOTES
1. Have you been to the ER, urgent care clinic since your last visit? Hospitalized since your last visit?     no  2. Have you seen or consulted any other health care providers outside of the [address] [place] [address] since your last visit? Include any pap smears or colon screening. Yes Where: pcp     3. Since your last visit, have you had any of the following symptoms? chest pains.

## 2021-02-08 ENCOUNTER — OFFICE VISIT (OUTPATIENT)
Dept: CARDIOLOGY CLINIC | Age: 76
End: 2021-02-08
Payer: COMMERCIAL

## 2021-02-08 VITALS
HEIGHT: 63 IN | HEART RATE: 72 BPM | TEMPERATURE: 97.2 F | DIASTOLIC BLOOD PRESSURE: 72 MMHG | WEIGHT: 183 LBS | SYSTOLIC BLOOD PRESSURE: 126 MMHG | BODY MASS INDEX: 32.43 KG/M2

## 2021-02-08 DIAGNOSIS — E03.9 ACQUIRED HYPOTHYROIDISM: ICD-10-CM

## 2021-02-08 DIAGNOSIS — I20.8 ATYPICAL ANGINA (HCC): Primary | ICD-10-CM

## 2021-02-08 DIAGNOSIS — E78.5 HYPERLIPIDEMIA, UNSPECIFIED HYPERLIPIDEMIA TYPE: ICD-10-CM

## 2021-02-08 PROCEDURE — G8536 NO DOC ELDER MAL SCRN: HCPCS | Performed by: INTERNAL MEDICINE

## 2021-02-08 PROCEDURE — G8400 PT W/DXA NO RESULTS DOC: HCPCS | Performed by: INTERNAL MEDICINE

## 2021-02-08 PROCEDURE — G8510 SCR DEP NEG, NO PLAN REQD: HCPCS | Performed by: INTERNAL MEDICINE

## 2021-02-08 PROCEDURE — G8417 CALC BMI ABV UP PARAM F/U: HCPCS | Performed by: INTERNAL MEDICINE

## 2021-02-08 PROCEDURE — 99213 OFFICE O/P EST LOW 20 MIN: CPT | Performed by: INTERNAL MEDICINE

## 2021-02-08 PROCEDURE — G8427 DOCREV CUR MEDS BY ELIG CLIN: HCPCS | Performed by: INTERNAL MEDICINE

## 2021-02-08 PROCEDURE — 1090F PRES/ABSN URINE INCON ASSESS: CPT | Performed by: INTERNAL MEDICINE

## 2021-02-08 PROCEDURE — 1101F PT FALLS ASSESS-DOCD LE1/YR: CPT | Performed by: INTERNAL MEDICINE

## 2021-02-08 RX ORDER — ASPIRIN 81 MG/1
81 TABLET ORAL DAILY
COMMUNITY

## 2021-02-08 NOTE — PROGRESS NOTES
1. Have you been to the ER, urgent care clinic since your last visit? Hospitalized since your last visit? No    2. Have you seen or consulted any other health care providers outside of the [address] [place] [address] since your last visit? Include any pap smears or colon screening.  no

## 2021-02-08 NOTE — PROGRESS NOTES
HISTORY OF PRESENT ILLNESS  Akbar Ahmadi is a 67 y.o. female. Cholesterol Problem  The history is provided by the patient. This is a chronic problem. The problem occurs constantly. The problem has not changed since onset. Pertinent negatives include no chest pain, no abdominal pain, no headaches and no shortness of breath. Chest Pain (Angina)   The history is provided by the patient. This is a new problem. The problem has been resolved. The problem occurs rarely. The pain is present in the right side (back). The quality of the pain is described as dull. The pain radiates to the right jaw. Pertinent negatives include no abdominal pain, no claudication, no cough, no dizziness, no exertional chest pressure, no fever, no headaches, no hemoptysis, no nausea, no near-syncope, no orthopnea, no palpitations, no PND, no shortness of breath, no sputum production, no vomiting and no weakness. Jaw Pain  The history is provided by the patient. This is a recurrent problem. The problem occurs rarely. The problem has been resolved. Pertinent negatives include no chest pain, no abdominal pain, no headaches and no shortness of breath. Nothing aggravates the symptoms. Nothing relieves the symptoms. Review of Systems   Constitutional: Negative for chills and fever. HENT: Negative for nosebleeds. Eyes: Negative for blurred vision and double vision. Respiratory: Negative for cough, hemoptysis, sputum production, shortness of breath and wheezing. Cardiovascular: Negative for chest pain, palpitations, orthopnea, claudication, leg swelling, PND and near-syncope. Gastrointestinal: Negative for abdominal pain, heartburn, nausea and vomiting. Musculoskeletal: Negative for myalgias. Skin: Negative for rash. Neurological: Negative for dizziness, weakness and headaches. Endo/Heme/Allergies: Does not bruise/bleed easily.      Family History   Problem Relation Age of Onset    Stroke Mother 80    Stroke Maternal Grandmother 95       Past Medical History:   Diagnosis Date    Hyperlipidemia     Thyroid disease        No past surgical history on file. Social History     Tobacco Use    Smoking status: Never Smoker    Smokeless tobacco: Never Used   Substance Use Topics    Alcohol use: No       Allergies   Allergen Reactions    Penicillins Rash    Statins-Hmg-Coa Reductase Inhibitors Myalgia       Prior to Admission medications    Medication Sig Start Date End Date Taking? Authorizing Provider   aspirin delayed-release 81 mg tablet Take 81 mg by mouth daily. Yes Provider, Historical   FENOFIBRATE NANOCRYSTALLIZED (TRICOR PO) Take 145 mg by mouth. Yes Provider, Historical   LEVOTHYROXINE SODIUM (LEVOTHYROXINE PO) Take 88 mg by mouth. Yes Provider, Historical         Visit Vitals  /72   Pulse 72   Temp 97.2 °F (36.2 °C)   Ht 5' 3\" (1.6 m)   Wt 83 kg (183 lb)   BMI 32.42 kg/m²       Cholesterol 132 110 - 200 mg/dL   Triglyceride 111 40 - 149 mg/dL   HDL 40 40 - 59 mg/dL   Cholesterol/HDL 3.3 0.0 - 5.0    LDL CALCULATION 70 50 - 99 mg/dL   VLDL CALCULATION 22 8 - 30 mg/dL   TSH 2.69    Physical Exam   Constitutional: She is oriented to person, place, and time. She appears well-developed and well-nourished. HENT:   Head: Normocephalic and atraumatic. Eyes: Conjunctivae are normal.   Neck: Neck supple. No JVD present. No tracheal deviation present. No thyromegaly present. Cardiovascular: Normal rate and regular rhythm. PMI is not displaced. Exam reveals no gallop, no S3 and no decreased pulses. No murmur heard. Pulmonary/Chest: No respiratory distress. She has no wheezes. She has no rales. She exhibits no tenderness. Abdominal: Soft. There is no abdominal tenderness. Musculoskeletal:         General: No edema. Neurological: She is alert and oriented to person, place, and time. Skin: Skin is warm. Psychiatric: She has a normal mood and affect.      Ms. Billie Wolfe has a reminder for a \"due or due soon\" health maintenance. I have asked that she contact her primary care provider for follow-up on this health maintenance. NUCLEAR [email or web address]     Findings:   1. Stress images reveal normal Myoview distrubution in all the LV segments in short axis, vertical and horizontal long axis views. 2. Resting images have a normal uptake. 3. Gated images reveal normal wall motion and the ejection fraction is calculated to be 90%. Conclusion:   1. Normal perfusion scan. 2. Normal wall motion and ejection fraction. 3. Low risk scan. I Have personally reviewed recent relevant labs available and discussed with patient  2018  Care Everywhere Result Report  LIPID COMPLETE PANELResulted: 9/3/2020 4:17 PM  [address]  Component Name Value Ref Range   Cholesterol 128 110 - 200 mg/dL   Triglyceride 80 40 - 149 mg/dL   HDL 52 >=40 mg/dL   Cholesterol/HDL 2.5 0.0 - 5.0    Non-HDL Cholesterol 76 <130 mg/dL   LDL CALCULATION 60 50 - 99 mg/dL   VLDL CALCULATION 16 8 - 30 mg/dL   LDL/HDL Ratio 1.2         Assessment         ICD-10-CM ICD-9-CM    1. Atypical angina (HCC)  I20.8 413.9     Stable continue treatment monitor   2. Hyperlipidemia, unspecified hyperlipidemia type  E78.5 272.4     Continue current treatment. LDL 69  continue current management   3. Acquired hypothyroidism  E03.9 244.9     Continue therapy lab with PCP. TSH normal in 2019 - Denies chest pain, sob, orthopnea, edema. Recent labs reviewed. Stable. 2020  Cardiac status stable. Occasional episode of atypical angina. Not required any medication. Continue therapy    2021  Cardiac status is stable chest pain and jaw pain are stable. Recent lab reviewed LDL controlled. Continue other therapy        There are no discontinued medications. No orders of the defined types were placed in this encounter. Follow-up and Dispositions    · Return in about 1 year (around 2022).

## 2022-02-11 ENCOUNTER — OFFICE VISIT (OUTPATIENT)
Dept: CARDIOLOGY CLINIC | Age: 77
End: 2022-02-11
Payer: COMMERCIAL

## 2022-02-11 VITALS
SYSTOLIC BLOOD PRESSURE: 124 MMHG | DIASTOLIC BLOOD PRESSURE: 72 MMHG | HEIGHT: 63 IN | OXYGEN SATURATION: 94 % | HEART RATE: 65 BPM | BODY MASS INDEX: 32.6 KG/M2 | WEIGHT: 184 LBS

## 2022-02-11 DIAGNOSIS — E03.9 ACQUIRED HYPOTHYROIDISM: ICD-10-CM

## 2022-02-11 DIAGNOSIS — Z01.810 PREOPERATIVE CARDIOVASCULAR EXAMINATION: ICD-10-CM

## 2022-02-11 DIAGNOSIS — I20.8 ATYPICAL ANGINA (HCC): Primary | ICD-10-CM

## 2022-02-11 DIAGNOSIS — E78.5 HYPERLIPIDEMIA, UNSPECIFIED HYPERLIPIDEMIA TYPE: ICD-10-CM

## 2022-02-11 PROCEDURE — 1101F PT FALLS ASSESS-DOCD LE1/YR: CPT | Performed by: INTERNAL MEDICINE

## 2022-02-11 PROCEDURE — G8417 CALC BMI ABV UP PARAM F/U: HCPCS | Performed by: INTERNAL MEDICINE

## 2022-02-11 PROCEDURE — 99214 OFFICE O/P EST MOD 30 MIN: CPT | Performed by: INTERNAL MEDICINE

## 2022-02-11 PROCEDURE — 1090F PRES/ABSN URINE INCON ASSESS: CPT | Performed by: INTERNAL MEDICINE

## 2022-02-11 PROCEDURE — G8536 NO DOC ELDER MAL SCRN: HCPCS | Performed by: INTERNAL MEDICINE

## 2022-02-11 PROCEDURE — G8432 DEP SCR NOT DOC, RNG: HCPCS | Performed by: INTERNAL MEDICINE

## 2022-02-11 PROCEDURE — G8427 DOCREV CUR MEDS BY ELIG CLIN: HCPCS | Performed by: INTERNAL MEDICINE

## 2022-02-11 PROCEDURE — G8400 PT W/DXA NO RESULTS DOC: HCPCS | Performed by: INTERNAL MEDICINE

## 2022-02-11 NOTE — PROGRESS NOTES
HISTORY OF PRESENT ILLNESS  Richard Snyder is a 65 y.o. female. Cholesterol Problem  The history is provided by the patient. This is a chronic problem. The problem occurs constantly. The problem has not changed since onset. Pertinent negatives include no chest pain, no abdominal pain, no headaches and no shortness of breath. Chest Pain (Angina)   The history is provided by the patient. This is a new problem. The problem has been resolved. The problem occurs rarely. The pain is present in the right side (back). The quality of the pain is described as dull. The pain radiates to the right jaw. Pertinent negatives include no abdominal pain, no claudication, no cough, no dizziness, no exertional chest pressure, no fever, no headaches, no hemoptysis, no nausea, no near-syncope, no orthopnea, no palpitations, no PND, no shortness of breath, no sputum production, no vomiting and no weakness. Jaw Pain  The history is provided by the patient. This is a recurrent problem. The problem occurs rarely. The problem has been resolved. Pertinent negatives include no chest pain, no abdominal pain, no headaches and no shortness of breath. Nothing aggravates the symptoms. Nothing relieves the symptoms. Follow-up  Pertinent negatives include no chest pain, no abdominal pain, no headaches and no shortness of breath. Review of Systems   Constitutional: Negative for chills and fever. HENT: Negative for nosebleeds. Eyes: Negative for blurred vision and double vision. Respiratory: Negative for cough, hemoptysis, sputum production, shortness of breath and wheezing. Cardiovascular: Negative for chest pain, palpitations, orthopnea, claudication, leg swelling, PND and near-syncope. Gastrointestinal: Negative for abdominal pain, heartburn, nausea and vomiting. Musculoskeletal: Negative for myalgias. Skin: Negative for rash. Neurological: Negative for dizziness, weakness and headaches.    Endo/Heme/Allergies: Does not bruise/bleed easily. Family History   Problem Relation Age of Onset    Stroke Mother 80    Stroke Maternal Grandmother 80       Past Medical History:   Diagnosis Date    Hyperlipidemia     Thyroid disease        No past surgical history on file. Social History     Tobacco Use    Smoking status: Never Smoker    Smokeless tobacco: Never Used   Substance Use Topics    Alcohol use: No       Allergies   Allergen Reactions    Penicillins Rash    Statins-Hmg-Coa Reductase Inhibitors Myalgia       Prior to Admission medications    Medication Sig Start Date End Date Taking? Authorizing Provider   aspirin delayed-release 81 mg tablet Take 81 mg by mouth daily. Yes Provider, Historical   FENOFIBRATE NANOCRYSTALLIZED (TRICOR PO) Take 145 mg by mouth. Yes Provider, Historical   LEVOTHYROXINE SODIUM (LEVOTHYROXINE PO) Take 88 mg by mouth. Yes Provider, Historical         Visit Vitals  /72 (BP 1 Location: Left upper arm, BP Patient Position: Sitting, BP Cuff Size: Adult)   Pulse 65   Ht 5' 3\" (1.6 m)   Wt 83.5 kg (184 lb)   SpO2 94%   BMI 32.59 kg/m²       Cholesterol 132 110 - 200 mg/dL   Triglyceride 111 40 - 149 mg/dL   HDL 40 40 - 59 mg/dL   Cholesterol/HDL 3.3 0.0 - 5.0    LDL CALCULATION 70 50 - 99 mg/dL   VLDL CALCULATION 22 8 - 30 mg/dL   TSH 2.69    Physical Exam  Constitutional:       Appearance: She is well-developed. HENT:      Head: Normocephalic and atraumatic. Eyes:      Conjunctiva/sclera: Conjunctivae normal.   Neck:      Thyroid: No thyromegaly. Vascular: No JVD. Trachea: No tracheal deviation. Cardiovascular:      Rate and Rhythm: Normal rate and regular rhythm. Chest Wall: PMI is not displaced. Pulses: No decreased pulses. Heart sounds: No murmur heard. No gallop. No S3 sounds. Pulmonary:      Effort: No respiratory distress. Breath sounds: No wheezing or rales. Chest:      Chest wall: No tenderness.    Abdominal:      Palpations: Abdomen is soft.      Tenderness: There is no abdominal tenderness. Musculoskeletal:      Cervical back: Neck supple. Skin:     General: Skin is warm. Neurological:      Mental Status: She is alert and oriented to person, place, and time. Ms. Domitila Chaudhry has a reminder for a \"due or due soon\" health maintenance. I have asked that she contact her primary care provider for follow-up on this health maintenance. NUCLEAR [id number]     Findings:   1. Stress images reveal normal Myoview distrubution in all the LV segments in short axis, vertical and horizontal long axis views. 2. Resting images have a normal uptake. 3. Gated images reveal normal wall motion and the ejection fraction is calculated to be 90%. Conclusion:   1. Normal perfusion scan. 2. Normal wall motion and ejection fraction. 3. Low risk scan. I Have personally reviewed recent relevant labs available and discussed with patient  2018  Care Everywhere Result Report  LIPID COMPLETE PANELResulted: 9/3/2020 4:17 PM  [address]  Component Name Value Ref Range   Cholesterol 128 110 - 200 mg/dL   Triglyceride 80 40 - 149 mg/dL   HDL 52 >=40 mg/dL   Cholesterol/HDL 2.5 0.0 - 5.0    Non-HDL Cholesterol 76 <130 mg/dL   LDL CALCULATION 60 50 - 99 mg/dL   VLDL CALCULATION 16 8 - 30 mg/dL   LDL/HDL Ratio 1.2       2021-lipid,tsh  Assessment         ICD-10-CM ICD-9-CM    1. Atypical angina (HCC)  I20.8 413.9     Stable symptom. Read continue medical management   2. Hyperlipidemia, unspecified hyperlipidemia type  E78.5 272.4     LDL controlled. HDL is low continue treatment   3. Acquired hypothyroidism  E03.9 244.9     Labs reviewed. TSH normalized after change of medication by PCP   4. Preoperative cardiovascular examination  Z01.810 V72.81     Stable cardiac status. Okay to proceed with cataract surgery as planned with low cardiac risk     2019 - Denies chest pain, sob, orthopnea, edema. Recent labs reviewed. Stable.   2020  Cardiac status stable. Occasional episode of atypical angina. Not required any medication. Continue therapy    2/2021  Cardiac status is stable chest pain and jaw pain are stable. Recent lab reviewed LDL controlled. Continue other therapy  2/2022  Cardiac status stable continue current medical management monitor  Okay to proceed with cataract surgery low cardiac risk    There are no discontinued medications. No orders of the defined types were placed in this encounter. Follow-up and Dispositions    · Return in about 1 year (around 2/11/2023).

## 2022-02-11 NOTE — PROGRESS NOTES
1. Have you been to the ER, urgent care clinic since your last visit? Hospitalized since your last visit? No    2. Have you seen or consulted any other health care providers outside of the [address] [place] [address] since your last visit? Include any pap smears or colon screening.  No

## 2022-03-19 PROBLEM — I20.8 ATYPICAL ANGINA (HCC): Status: ACTIVE | Noted: 2017-01-17

## 2022-03-19 PROBLEM — I20.89 ATYPICAL ANGINA: Status: ACTIVE | Noted: 2017-01-17

## 2022-03-20 PROBLEM — R68.84 JAW PAIN: Status: ACTIVE | Noted: 2017-01-17

## 2022-03-20 PROBLEM — E78.5 HYPERLIPIDEMIA: Status: ACTIVE | Noted: 2017-01-17

## 2023-02-24 ENCOUNTER — OFFICE VISIT (OUTPATIENT)
Age: 78
End: 2023-02-24
Payer: MEDICARE

## 2023-02-24 VITALS
DIASTOLIC BLOOD PRESSURE: 66 MMHG | OXYGEN SATURATION: 98 % | BODY MASS INDEX: 28 KG/M2 | WEIGHT: 158 LBS | HEART RATE: 67 BPM | HEIGHT: 63 IN | SYSTOLIC BLOOD PRESSURE: 116 MMHG

## 2023-02-24 DIAGNOSIS — E03.9 HYPOTHYROIDISM, UNSPECIFIED TYPE: ICD-10-CM

## 2023-02-24 DIAGNOSIS — E78.5 HYPERLIPIDEMIA, UNSPECIFIED HYPERLIPIDEMIA TYPE: ICD-10-CM

## 2023-02-24 DIAGNOSIS — I20.8 OTHER FORMS OF ANGINA PECTORIS (HCC): Primary | ICD-10-CM

## 2023-02-24 PROCEDURE — G8484 FLU IMMUNIZE NO ADMIN: HCPCS | Performed by: INTERNAL MEDICINE

## 2023-02-24 PROCEDURE — 99213 OFFICE O/P EST LOW 20 MIN: CPT | Performed by: INTERNAL MEDICINE

## 2023-02-24 PROCEDURE — 1036F TOBACCO NON-USER: CPT | Performed by: INTERNAL MEDICINE

## 2023-02-24 PROCEDURE — G8400 PT W/DXA NO RESULTS DOC: HCPCS | Performed by: INTERNAL MEDICINE

## 2023-02-24 PROCEDURE — G8427 DOCREV CUR MEDS BY ELIG CLIN: HCPCS | Performed by: INTERNAL MEDICINE

## 2023-02-24 PROCEDURE — 1123F ACP DISCUSS/DSCN MKR DOCD: CPT | Performed by: INTERNAL MEDICINE

## 2023-02-24 PROCEDURE — G8417 CALC BMI ABV UP PARAM F/U: HCPCS | Performed by: INTERNAL MEDICINE

## 2023-02-24 PROCEDURE — 1090F PRES/ABSN URINE INCON ASSESS: CPT | Performed by: INTERNAL MEDICINE

## 2023-02-24 RX ORDER — CHLORAL HYDRATE 500 MG
CAPSULE ORAL DAILY
COMMUNITY

## 2023-02-24 RX ORDER — LEVOTHYROXINE SODIUM 0.1 MG/1
TABLET ORAL
COMMUNITY
Start: 2023-01-29

## 2023-02-24 RX ORDER — VITAMIN B COMPLEX
TABLET ORAL
COMMUNITY

## 2023-02-24 RX ORDER — FENOFIBRATE 145 MG/1
TABLET, COATED ORAL
COMMUNITY
Start: 2023-01-29

## 2023-02-24 ASSESSMENT — PATIENT HEALTH QUESTIONNAIRE - PHQ9
1. LITTLE INTEREST OR PLEASURE IN DOING THINGS: 0
SUM OF ALL RESPONSES TO PHQ QUESTIONS 1-9: 0
2. FEELING DOWN, DEPRESSED OR HOPELESS: 0
SUM OF ALL RESPONSES TO PHQ QUESTIONS 1-9: 0
SUM OF ALL RESPONSES TO PHQ QUESTIONS 1-9: 0
DEPRESSION UNABLE TO ASSESS: FUNCTIONAL CAPACITY MOTIVATION LIMITS ACCURACY
SUM OF ALL RESPONSES TO PHQ9 QUESTIONS 1 & 2: 0
SUM OF ALL RESPONSES TO PHQ QUESTIONS 1-9: 0

## 2023-02-24 NOTE — PROGRESS NOTES
HISTORY OF PRESENT ILLNESS  Amanda Lopez is a 65 y.o. female. Cholesterol Problem  The history is provided by the patient. This is a chronic problem. The problem occurs constantly. The problem has not changed since onset. Pertinent negatives include no chest pain, no abdominal pain, no headaches and no shortness of breath. Chest Pain (Angina)   The history is provided by the patient. This is a new problem. The problem has been resolved. The problem occurs rarely. The pain is present in the right side (back). The quality of the pain is described as dull. The pain radiates to the right jaw. Pertinent negatives include no abdominal pain, no claudication, no cough, no dizziness, no exertional chest pressure, no fever, no headaches, no hemoptysis, no nausea, no near-syncope, no orthopnea, no palpitations, no PND, no shortness of breath, no sputum production, no vomiting and no weakness. Jaw Pain  The history is provided by the patient. This is a recurrent problem. The problem occurs rarely. The problem has been resolved. Pertinent negatives include no chest pain, no abdominal pain, no headaches and no shortness of breath. Nothing aggravates the symptoms. Nothing relieves the symptoms. Follow-up  Pertinent negatives include no chest pain, no abdominal pain, no headaches and no shortness of breath. Review of Systems   Constitutional: Negative for chills and fever. HENT: Negative for nosebleeds. Eyes: Negative for blurred vision and double vision. Respiratory: Negative for cough, hemoptysis, sputum production, shortness of breath and wheezing. Cardiovascular: Negative for chest pain, palpitations, orthopnea, claudication, leg swelling, PND and near-syncope. Gastrointestinal: Negative for abdominal pain, heartburn, nausea and vomiting. Musculoskeletal: Negative for myalgias. Skin: Negative for rash. Neurological: Negative for dizziness, weakness and headaches.    Endo/Heme/Allergies: Does not bruise/bleed easily. Family History   Problem Relation Age of Onset    Stroke Mother 80    Stroke Maternal Grandmother 95       Past Medical History:   Diagnosis Date    Hyperlipidemia     Thyroid disease        No past surgical history on file. Social History     Tobacco Use    Smoking status: Never    Smokeless tobacco: Never   Substance Use Topics    Alcohol use: No       Allergies   Allergen Reactions    Statins Myalgia    Penicillins Rash       Prior to Admission medications    Medication Sig Start Date End Date Taking? Authorizing Provider   fenofibrate (TRICOR) 145 MG tablet  1/29/23  Yes Historical Provider, MD   levothyroxine (SYNTHROID) 100 MCG tablet  1/29/23  Yes Historical Provider, MD   Omega-3 Fatty Acids (FISH OIL) 1000 MG capsule Take by mouth daily   Yes Historical Provider, MD   Coenzyme Q10 (COQ10) 100 MG CAPS Take by mouth   Yes Historical Provider, MD   aspirin 81 MG EC tablet Take 81 mg by mouth daily   Yes Ar Automatic Reconciliation         /66 (Site: Left Upper Arm, Position: Sitting, Cuff Size: Medium Adult)   Pulse 67   Ht 5' 3\" (1.6 m)   Wt 158 lb (71.7 kg)   SpO2 98%   BMI 27.99 kg/m²     Physical Exam  Constitutional:       Appearance: She is well-developed. HENT:      Head: Normocephalic and atraumatic. Eyes:      Conjunctiva/sclera: Conjunctivae normal.   Neck:      Thyroid: No thyromegaly. Vascular: No JVD. Trachea: No tracheal deviation. Cardiovascular:      Rate and Rhythm: Normal rate and regular rhythm. Chest Wall: PMI is not displaced. Pulses: No decreased pulses. Heart sounds: No murmur heard. No gallop. No S3 sounds. Pulmonary:      Effort: No respiratory distress. Breath sounds: No wheezing or rales. Chest:      Chest wall: No tenderness. Abdominal:      Palpations: Abdomen is soft. Tenderness: There is no abdominal tenderness. Musculoskeletal:      Cervical back: Neck supple.    Skin:     General: Skin is warm.   Neurological:      Mental Status: She is alert and oriented to person, place, and time. Ms. Vic Lopez has a reminder for a \"due or due soon\" health maintenance. I have asked that she contact her primary care provider for follow-up on this health maintenance. NUCLEAR [email or web address]     Findings:   1. Stress images reveal normal Myoview distrubution in all the LV segments in short axis, vertical and horizontal long axis views. 2. Resting images have a normal uptake. 3. Gated images reveal normal wall motion and the ejection fraction is calculated to be 90%. Conclusion:   1. Normal perfusion scan. 2. Normal wall motion and ejection fraction. 3. Low risk scan. I Have personally reviewed recent relevant labs available and discussed with patient  2018  Care Everywhere Result Report  LIPID COMPLETE PANELResulted: 9/3/2020 4:17 PM  [address]  Component Name Value Ref Range   Cholesterol 128 110 - 200 mg/dL   Triglyceride 80 40 - 149 mg/dL   HDL 52 >=40 mg/dL   Cholesterol/HDL 2.5 0.0 - 5.0    Non-HDL Cholesterol 76 <130 mg/dL   LDL CALCULATION 60 50 - 99 mg/dL   VLDL CALCULATION 16 8 - 30 mg/dL   LDL/HDL Ratio 1.2       2021-lipid,tsh  10/2022-lipid, CBC, BMP, TSH    No flowsheet data found. Assessment        Diagnosis Orders   1. Other forms of angina pectoris ([address]        2. Hyperlipidemia, unspecified hyperlipidemia type        3. Hypothyroidism, unspecified type            There are no discontinued medications. 2019 - Denies chest pain, sob, orthopnea, edema. Recent labs reviewed. Stable. 2020  Cardiac status stable. Occasional episode of atypical angina. Not required any medication. Continue therapy    2021  Cardiac status is stable chest pain and jaw pain are stable. Recent lab reviewed LDL controlled.   Continue other therapy  2022  Cardiac status stable continue current medical management monitor  Okay to proceed with cataract surgery low cardiac risk  2/2023  Stable cardiac status. Labs reviewed.   Continue treatment

## 2024-02-06 ENCOUNTER — OFFICE VISIT (OUTPATIENT)
Age: 79
End: 2024-02-06
Payer: MEDICARE

## 2024-02-06 VITALS
SYSTOLIC BLOOD PRESSURE: 122 MMHG | HEIGHT: 63 IN | WEIGHT: 155.6 LBS | BODY MASS INDEX: 27.57 KG/M2 | OXYGEN SATURATION: 95 % | DIASTOLIC BLOOD PRESSURE: 72 MMHG | HEART RATE: 63 BPM

## 2024-02-06 DIAGNOSIS — I20.89 ATYPICAL ANGINA: ICD-10-CM

## 2024-02-06 DIAGNOSIS — E78.5 HYPERLIPIDEMIA, UNSPECIFIED HYPERLIPIDEMIA TYPE: ICD-10-CM

## 2024-02-06 DIAGNOSIS — E03.9 HYPOTHYROIDISM, UNSPECIFIED TYPE: ICD-10-CM

## 2024-02-06 PROCEDURE — 99213 OFFICE O/P EST LOW 20 MIN: CPT | Performed by: NURSE PRACTITIONER

## 2024-02-06 PROCEDURE — 1090F PRES/ABSN URINE INCON ASSESS: CPT | Performed by: NURSE PRACTITIONER

## 2024-02-06 PROCEDURE — G8417 CALC BMI ABV UP PARAM F/U: HCPCS | Performed by: NURSE PRACTITIONER

## 2024-02-06 PROCEDURE — 93000 ELECTROCARDIOGRAM COMPLETE: CPT | Performed by: NURSE PRACTITIONER

## 2024-02-06 PROCEDURE — 1036F TOBACCO NON-USER: CPT | Performed by: NURSE PRACTITIONER

## 2024-02-06 PROCEDURE — G8484 FLU IMMUNIZE NO ADMIN: HCPCS | Performed by: NURSE PRACTITIONER

## 2024-02-06 PROCEDURE — G8427 DOCREV CUR MEDS BY ELIG CLIN: HCPCS | Performed by: NURSE PRACTITIONER

## 2024-02-06 PROCEDURE — G8400 PT W/DXA NO RESULTS DOC: HCPCS | Performed by: NURSE PRACTITIONER

## 2024-02-06 PROCEDURE — 1123F ACP DISCUSS/DSCN MKR DOCD: CPT | Performed by: NURSE PRACTITIONER

## 2024-02-06 ASSESSMENT — PATIENT HEALTH QUESTIONNAIRE - PHQ9
SUM OF ALL RESPONSES TO PHQ QUESTIONS 1-9: 0
SUM OF ALL RESPONSES TO PHQ QUESTIONS 1-9: 0
SUM OF ALL RESPONSES TO PHQ9 QUESTIONS 1 & 2: 0
1. LITTLE INTEREST OR PLEASURE IN DOING THINGS: 0
SUM OF ALL RESPONSES TO PHQ QUESTIONS 1-9: 0
2. FEELING DOWN, DEPRESSED OR HOPELESS: 0
SUM OF ALL RESPONSES TO PHQ QUESTIONS 1-9: 0

## 2024-02-06 NOTE — PROGRESS NOTES
risk  2/2023  Stable cardiac status.  Labs reviewed.  Continue treatment  2/6/2024  Stable cardiac status.  Recent Covid infection.  Reports mild case and is now negative. In office EKG NSR.  B/P controlled,continue current medications.  Routine labs with PCP.

## 2024-02-06 NOTE — PROGRESS NOTES
1. Have you been to the ER, urgent care clinic since your last visit?  Hospitalized since your last visit?     no    2. Have you seen or consulted any other health care providers outside of the [facility] since your last visit?  Include any pap smears or colon screening.      .      Yes pcp

## 2024-12-12 ENCOUNTER — OFFICE VISIT (OUTPATIENT)
Age: 79
End: 2024-12-12

## 2024-12-12 VITALS — HEIGHT: 63 IN | WEIGHT: 159 LBS | BODY MASS INDEX: 28.17 KG/M2

## 2024-12-12 DIAGNOSIS — M17.12 PRIMARY OSTEOARTHRITIS OF LEFT KNEE: Primary | ICD-10-CM

## 2024-12-12 RX ORDER — LIDOCAINE 50 MG/G
OINTMENT TOPICAL 2 TIMES DAILY
Qty: 30 G | Refills: 1 | Status: SHIPPED | OUTPATIENT
Start: 2024-12-12 | End: 2024-12-26

## 2024-12-12 NOTE — PROGRESS NOTES
Patient: Arti Sherman                MRN: [id number]       SSN: [id number]  YOB: 1945        AGE: 79 y.o.        SEX: female  BMI: Body mass index is 28.17 kg/m².    PCP: Jose Gandara APRN - [facility]  12/12/24    Chief Complaint: Knee Pain (Left knee)      1. Primary osteoarthritis of left knee  -     AMB POC XRAY, KNEE; COMPLETE, 4+ VIEW  -     lidocaine (XYLOCAINE) 5 % ointment; Apply topically 2 times daily for 14 days, Topical, 2 TIMES DAILY Starting Thu 12/12/2024, Until Thu 12/26/2024, For 14 days, Disp-30 g, R-1, Normal  -     [facility] - [facility] - [facility]        HPI:  Arti Sherman is a 79 y.o. female with chief complaint of   Chief Complaint   Patient presents with    Knee Pain     Left knee       Patient presents today with left knee pain for about 7 months.  She states states that her pain only gets up to a 2 out of 10 and is off-and-on in nature.  She locates majority of it at the lateral aspect of her knee.  She does report occasional feelings of instability but this is very infrequent.  Up to this point she has tried Advil with good relief.  She reports only taking this every couple weeks.  She denies any injections physical therapy or topical agents.    They have an orthopedic history of extensive lumbar degenerative disc disease.    They have a past medical history of hypothyroidism, hyperlipidemia, hypertension, history of stable angina followed by cardiology taking a daily baby aspirin.     IMAGING:  Imaging read by myself and interpreted as follows:  December 12, 2024  4 view x-ray of the left knee including AP, tunnel, sunrise, and lateral demonstrates tricompartmental osteoarthritis with bone-on-bone articulation in the lateral compartment, osteophytosis, subchondral sclerosis, and subchondral cyst formation.  There is a large osteophyte coming off of the lateral aspect of the tibial plateau.  Osteopenic bone.  On the lateral there is a

## 2025-01-02 ENCOUNTER — HOSPITAL ENCOUNTER (OUTPATIENT)
Facility: HOSPITAL | Age: 80
Setting detail: RECURRING SERIES
Discharge: HOME | End: 2025-01-05
Payer: COMMERCIAL

## 2025-01-02 PROCEDURE — 97110 THERAPEUTIC EXERCISES: CPT | Performed by: PHYSICAL THERAPIST

## 2025-01-02 PROCEDURE — 97162 PT EVAL MOD COMPLEX 30 MIN: CPT | Performed by: PHYSICAL THERAPIST

## 2025-01-02 NOTE — THERAPY EVALUATION
[facility]  [address] [facility] [address] Ph: [phone] Fx: [phone]  Plan of Care / Statement of Necessity for Physical Therapy Services     Patient Name: Arti Sherman : 1945   Medical   Diagnosis: Left knee pain [M25.562]  Treatment Diagnosis:   M25.562  LEFT KNEE PAIN     Onset Date: 2024     Referral Source: Grisel Roman PA-C Start of Care (SOC): 2025   Prior Hospitalization: See medical history Provider #: [id number]   Prior Level of Function:  Independent.    Comorbidities:  Musculoskeletal disorders, Social determinants of health: Physical activity, and Other: Hypothyroid, HTN     Assessment / key information:  Patient with signs and symptoms consistent with left knee pain since September.  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  She also attributes some of these symptoms to having some back issues.  Her AROM is 0-128 in the right knee; 4-128 left knee.  MMT is 4/5 for quads and hamstrings.  Gait is without deviation at normal rashida.  Her 5x sit to stand time is 15 seconds.  Functionally she notes she avoids stairs and at home does them one at a time with use of a hand rail.      Patient will benefit from a program of skilled physical therapy to include therapeutic exercises to address strength deficits, therapeutic activities to improve functional mobility, neuromuscular reeducation to address balance, coordination and proprioception, manual therapy to address ROM and tissue extensibility and modalities as indicated.  All questions were answered.      Not post operative    Evaluation Complexity:  History:  MEDIUM  Complexity : 1-2 comorbidities / personal factors will impact the outcome/ POC ; Examination:  MEDIUM Complexity : 3 Standardized tests and measures addressin body structure, function, activity limitation and / or

## 2025-01-02 NOTE — PROGRESS NOTES
[]R Medial   []L []R Baja    Patellar Tracking   []L [x]R Glide (Lat)   []L [x]R Tilt (Lat)     []L []R Glide (Med)  []L []R Tilt (Med)      []L []R Tile (Inf)     Patellar Mobility   [x]L []R Hypermobile []L []R Hypomobile         Special Tests:  Lachmans  [x] Neg    [] Pos   BENJA   [x] Neg    [] Pos   ALRI   [x] Neg    [] Pos   Valgus@ 0 Degrees [x] Neg    [] Pos   Valgus@ 30 Degrees [x] Neg    [] Pos   Varus@ 0 Degrees [x] Neg    [] Pos   Varus@ 30 Degrees [x] Neg    [] Pos   Lorne Sign  [] Neg    [x] Pos   Drake's Test  [] Neg    [x] Pos  Anterior Drawer [x] Neg    [] Pos                Other tests/comments:  5x Sit to Stand  15 seconds.    IMAGING: per Grisel Roman's PN of 12/12/2024  Imaging read by myself and interpreted as follows:  December 12, 2024  4 view x-ray of the left knee including AP, tunnel, sunrise, and lateral demonstrates tricompartmental osteoarthritis with bone-on-bone articulation in the lateral compartment, osteophytosis, subchondral sclerosis, and subchondral cyst formation.  There is a large osteophyte coming off of the lateral aspect of the tibial plateau.  Osteopenic bone.  On the lateral there is a large osteophyte coming off of the superior pole of the patella.    ASSESSMENT/Changes in Function:  Patient with signs and symptoms consistent with left knee pain since September.  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  She also attributes some of these symptoms to having some back issues.  Her AROM is 0-128 in the right knee; 4-128 left knee.  MMT is 4/5 for quads and hamstrings.  Gait is without deviation at normal rashida.  Her 5x sit to stand time is 15 seconds.  Functionally she notes she avoids stairs and at home does them one at a time with use of a hand rail.      Patient will continue to benefit from skilled PT services to modify and progress therapeutic interventions, analyze and

## 2025-01-07 ENCOUNTER — HOSPITAL ENCOUNTER (OUTPATIENT)
Facility: HOSPITAL | Age: 80
Setting detail: RECURRING SERIES
Discharge: HOME | End: 2025-01-10
Payer: COMMERCIAL

## 2025-01-07 DIAGNOSIS — M25.562 CHRONIC PAIN OF LEFT KNEE: Primary | ICD-10-CM

## 2025-01-07 DIAGNOSIS — G89.29 CHRONIC PAIN OF LEFT KNEE: Primary | ICD-10-CM

## 2025-01-07 PROCEDURE — 97530 THERAPEUTIC ACTIVITIES: CPT

## 2025-01-07 PROCEDURE — 97112 NEUROMUSCULAR REEDUCATION: CPT

## 2025-01-07 PROCEDURE — 97110 THERAPEUTIC EXERCISES: CPT

## 2025-01-07 NOTE — PROGRESS NOTES
PHYSICAL / OCCUPATIONAL THERAPY - DAILY TREATMENT NOTE    Patient Name: Arti Sherman    Date: 2025    : 1945  Insurance: Payor: MEDICARE / Plan: MEDICARE PART A AND B / Product Type: *No Product type* /      Patient  verified Yes     Visit #   Current / Total 2 24   Time   In / Out 8:56 9:34   Pain   In / Out 0 0   Subjective Functional Status/Changes: She notes she has no pain upon arrival, has been doing some of her exercise at home.     TREATMENT AREA =  Left knee pain [M25.562]     OBJECTIVE      Therapeutic Procedures:    92659 Therapeutic Exercise (timed):  increase ROM, strength, coordination, balance, and proprioception to improve patient's ability to progress to PLOF and address remaining functional goals.   Tx Min Billable or 1:1 Min   (if diff from Tx Min) Details:   15  See flow sheet as applicable     28399 Neuromuscular Re-Education (timed):  improve balance, coordination, kinesthetic sense, posture, core stability and proprioception to improve patient's ability to develop conscious control of individual muscles and awareness of position of extremities in order to progress to PLOF and address remaining functional goals.   Tx Min Billable or 1:1 Min   (if diff from Tx Min) Details:   15  See flow sheet as applicable     84560 Therapeutic Activity (timed):  use of dynamic activities replicating functional movements to increase ROM, strength, coordination, balance, and proprioception in order to improve patient's ability to progress to PLOF and address remaining functional goals.   Tx Min Billable or 1:1 Min   (if diff from Tx Min) Details:   8  See flow sheet as applicable       38  [facility] Totals Reminder: bill using total billable min of TIMED therapeutic procedures (example: do not include dry needle or estim unattended, both untimed codes, in totals to left)  8-22 min = 1 unit; 23-37 min = 2 units; 38-52 min = 3 units; 53-67 min = 4 units; 68-82 min = 5 units   Total Total     [x]

## 2025-01-09 ENCOUNTER — HOSPITAL ENCOUNTER (OUTPATIENT)
Facility: HOSPITAL | Age: 80
Setting detail: RECURRING SERIES
Discharge: HOME | End: 2025-01-12
Payer: COMMERCIAL

## 2025-01-09 PROCEDURE — 97530 THERAPEUTIC ACTIVITIES: CPT

## 2025-01-09 PROCEDURE — 97110 THERAPEUTIC EXERCISES: CPT

## 2025-01-09 PROCEDURE — 97112 NEUROMUSCULAR REEDUCATION: CPT

## 2025-01-09 NOTE — PROGRESS NOTES
2/2/25  Auth due MAURICIO    PLAN  - Continue Plan of Care    MATT GLASER PTA    1/9/2025    8:41 AM  If an interpreting service was utilized for treatment of this patient, the contents of this document represent the material reviewed with the patient via the .     Future Appointments   Date Time Provider Department Center   1/9/2025  9:00 AM Matt Glaser PTA [facility]   1/14/2025  9:00 AM Matt Glaser PTA [facility]   1/16/2025  9:00 AM Van Bolaños, PT [facility]   1/21/2025  9:00 AM Matt Glaser, PTA [facility]   1/23/2025  9:00 AM Matt Glaser, PTA [facility]   1/28/2025  9:00 AM Matt Glaser, PTA [facility]   1/30/2025  9:00 AM Matt Glaser, PTA [facility]

## 2025-01-14 ENCOUNTER — HOSPITAL ENCOUNTER (OUTPATIENT)
Facility: HOSPITAL | Age: 80
Setting detail: RECURRING SERIES
Discharge: HOME | End: 2025-01-17
Payer: COMMERCIAL

## 2025-01-14 PROCEDURE — 97110 THERAPEUTIC EXERCISES: CPT

## 2025-01-14 PROCEDURE — 97112 NEUROMUSCULAR REEDUCATION: CPT

## 2025-01-14 PROCEDURE — 97530 THERAPEUTIC ACTIVITIES: CPT

## 2025-01-16 ENCOUNTER — HOSPITAL ENCOUNTER (OUTPATIENT)
Facility: HOSPITAL | Age: 80
Setting detail: RECURRING SERIES
Discharge: HOME | End: 2025-01-19
Payer: COMMERCIAL

## 2025-01-16 PROCEDURE — 97530 THERAPEUTIC ACTIVITIES: CPT | Performed by: PHYSICAL THERAPIST

## 2025-01-16 PROCEDURE — 97110 THERAPEUTIC EXERCISES: CPT | Performed by: PHYSICAL THERAPIST

## 2025-01-16 PROCEDURE — 97112 NEUROMUSCULAR REEDUCATION: CPT | Performed by: PHYSICAL THERAPIST

## 2025-01-21 ENCOUNTER — HOSPITAL ENCOUNTER (OUTPATIENT)
Facility: HOSPITAL | Age: 80
Setting detail: RECURRING SERIES
Discharge: HOME | End: 2025-01-24
Payer: COMMERCIAL

## 2025-01-21 PROCEDURE — 97112 NEUROMUSCULAR REEDUCATION: CPT

## 2025-01-21 PROCEDURE — 97110 THERAPEUTIC EXERCISES: CPT

## 2025-01-21 PROCEDURE — 97530 THERAPEUTIC ACTIVITIES: CPT

## 2025-01-21 NOTE — PROGRESS NOTES
PHYSICAL / OCCUPATIONAL THERAPY - DAILY TREATMENT NOTE    Patient Name: Arti Sherman    Date: 2025    : 1945  Insurance: Payor: KENDRA RAMOS / Plan: KELBY RAMOS [place] / Product Type: *No Product type* /      Patient  verified Yes     Visit #   Current / Total 6 24   Time   In / Out [id number]   Pain   In / Out 1 0   Subjective Functional Status/Changes: Patient states she thinks the cold weather makes her knee pain worse.      TREATMENT AREA =  Left knee pain [M25.562]     OBJECTIVE      Therapeutic Procedures:    Therapeutic Procedures:  38591 Therapeutic Exercise (timed):  increase ROM, strength, coordination, balance, and proprioception to improve patient's ability to progress to PLOF and address remaining functional goals.   Tx Min Billable or 1:1 Min   (if diff from Tx Min) Details:   15   See flow sheet as applicable      18354 Neuromuscular Re-Education (timed):  improve balance, coordination, kinesthetic sense, posture, core stability and proprioception to improve patient's ability to develop conscious control of individual muscles and awareness of position of extremities in order to progress to PLOF and address remaining functional goals.   Tx Min Billable or 1:1 Min   (if diff from Tx Min) Details:   15   See flow sheet as applicable      85989 Therapeutic Activity (timed):  use of dynamic activities replicating functional movements to increase ROM, strength, coordination, balance, and proprioception in order to improve patient's ability to progress to PLOF and address remaining functional goals.   Tx Min Billable or 1:1 Min   (if diff from Tx Min) Details:   8   See flow sheet as applicable         38   [facility] Totals Reminder: bill using total billable min of TIMED therapeutic procedures (example: do not include dry needle or estim unattended, both untimed codes, in totals to left)  8-22 min = 1 unit; 23-37 min = 2 units; 38-52 min = 3 units; 53-67 min = 4 units; 68-82 min = 5 units   Total Total

## 2025-01-23 ENCOUNTER — HOSPITAL ENCOUNTER (OUTPATIENT)
Facility: HOSPITAL | Age: 80
Setting detail: RECURRING SERIES
Discharge: HOME | End: 2025-01-26
Payer: COMMERCIAL

## 2025-01-23 PROCEDURE — 97530 THERAPEUTIC ACTIVITIES: CPT

## 2025-01-23 PROCEDURE — 97112 NEUROMUSCULAR REEDUCATION: CPT

## 2025-01-23 PROCEDURE — 97110 THERAPEUTIC EXERCISES: CPT

## 2025-01-23 NOTE — PROGRESS NOTES
PHYSICAL / OCCUPATIONAL THERAPY - DAILY TREATMENT NOTE    Patient Name: Arti Sherman    Date: 2025    : 1945  Insurance: Payor: KENDRA RAMOS / Plan: [facility] [place] / Product Type: *No Product type* /      Patient  verified Yes     Visit #   Current / Total 7 24   Time   In / Out 858 940   Pain   In / Out 0 0   Subjective Functional Status/Changes: Patient states overall she has no pain and her knee does not limit her from her daily tasks.      TREATMENT AREA =  Left knee pain [M25.562]     OBJECTIVE        Therapeutic Procedures:  92965 Therapeutic Exercise (timed):  increase ROM, strength, coordination, balance, and proprioception to improve patient's ability to progress to PLOF and address remaining functional goals.   Tx Min Billable or 1:1 Min   (if diff from Tx Min) Details:   17   See flow sheet as applicable      97798 Neuromuscular Re-Education (timed):  improve balance, coordination, kinesthetic sense, posture, core stability and proprioception to improve patient's ability to develop conscious control of individual muscles and awareness of position of extremities in order to progress to PLOF and address remaining functional goals.   Tx Min Billable or 1:1 Min   (if diff from Tx Min) Details:   15   See flow sheet as applicable      95103 Therapeutic Activity (timed):  use of dynamic activities replicating functional movements to increase ROM, strength, coordination, balance, and proprioception in order to improve patient's ability to progress to PLOF and address remaining functional goals.   Tx Min Billable or 1:1 Min   (if diff from Tx Min) Details:   10   See flow sheet as applicable         42   [facility] Totals Reminder: bill using total billable min of TIMED therapeutic procedures (example: do not include dry needle or estim unattended, both untimed codes, in totals to left)  8-22 min = 1 unit; 23-37 min = 2 units; 38-52 min = 3 units; 53-67 min = 4 units; 68-82 min = 5 units   Total Total

## 2025-01-28 ENCOUNTER — HOSPITAL ENCOUNTER (OUTPATIENT)
Facility: HOSPITAL | Age: 80
Setting detail: RECURRING SERIES
Discharge: HOME | End: 2025-01-31
Payer: COMMERCIAL

## 2025-01-28 PROCEDURE — 97110 THERAPEUTIC EXERCISES: CPT

## 2025-01-28 PROCEDURE — 97530 THERAPEUTIC ACTIVITIES: CPT

## 2025-01-28 PROCEDURE — 97112 NEUROMUSCULAR REEDUCATION: CPT

## 2025-01-28 NOTE — PROGRESS NOTES
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      TREATMENT AREA =  Left knee pain [M25.562]     OBJECTIVE        Therapeutic Procedures:    09585 Therapeutic Exercise (timed):  increase ROM, strength, coordination, balance, and proprioception to improve patient's ability to progress to PLOF and address remaining functional goals.   Tx Min Billable or 1:1 Min   (if diff from Tx Min) Details:   15   See flow sheet as applicable      33533 Neuromuscular Re-Education (timed):  improve balance, coordination, kinesthetic sense, posture, core stability and proprioception to improve patient's ability to develop conscious control of individual muscles and awareness of position of extremities in order to progress to PLOF and address remaining functional goals.   Tx Min Billable or 1:1 Min   (if diff from Tx Min) Details:   15   See flow sheet as applicable      94301 Therapeutic Activity (timed):  use of dynamic activities replicating functional movements to increase ROM, strength, coordination, balance, and proprioception in order to improve patient's ability to progress to PLOF and address remaining functional goals.   Tx Min Billable or 1:1 Min   (if diff from Tx Min) Details:   10   See flow sheet as applicable         40   [facility] Totals Reminder: bill using total billable min of TIMED therapeutic procedures (example: do not include dry needle or estim unattended, both untimed codes, in totals to left)  8-22 min = 1 unit; 23-37 min = 2 units; 38-52 min = 3 units; 53-67 min = 4 units; 68-82 min = 5

## 2025-01-30 ENCOUNTER — HOSPITAL ENCOUNTER (OUTPATIENT)
Facility: HOSPITAL | Age: 80
Setting detail: RECURRING SERIES
End: 2025-01-30
Payer: COMMERCIAL

## 2025-01-30 PROCEDURE — 97110 THERAPEUTIC EXERCISES: CPT

## 2025-01-30 PROCEDURE — 97112 NEUROMUSCULAR REEDUCATION: CPT

## 2025-01-30 PROCEDURE — 97530 THERAPEUTIC ACTIVITIES: CPT

## 2025-01-30 NOTE — THERAPY DISCHARGE
[facility]  [address] [facility] [address] Ph: [phone] Fx: [phone]  DISCHARGE SUMMARY  Patient Name: Arti Sherman : 1945   Treatment/Medical Diagnosis: Left knee pain [M25.562]   Referral Source: Grisel Roman PA-C     Date of Initial Visit: 2025 Attended Visits: 9 Missed Visits: 0     SUMMARY OF TREATMENT    Patient report 75% improvement with overall symptoms. Patient negotiates stairs with reciprocal gait pattern with use of one handrail. Patient continues to lack 4 deg from terminal knee extension, but knee flexion remains WNL. Patient has initiated a karate class that she attend 2x a week.        CURRENT STATUS      Short Term Goals: To be accomplished in 2 WEEKS  1.  Patient will become proficient in their HEP and will be compliant in performing that program.  Evaluation:   Patient given a written/illustrated HEP.  Current: MET: pt reports performing HEP. 25     Long Term Goals: To be accomplished in 8 WEEKS  1. Patient's pain level will be 0/10-2/10 with activity in order to improve patient's ability to perform normal ADLs.  Evaluation:  0/10-3/10  Current:MET: Pain 0/10-2/10.  25  2. Patient will demonstrate 0-128 degrees AROM left knee to increase ease of ADLs.  Evaluation:  AROM left knee 4-128 degrees  Current: remains: AROM left knee 0-4-128 deg. 25  3. Patient will improve her LEFS score by 9 points to indicate increased functional mobility.  Evaluation:  LEFS score 58/80  Current: remains: LEFS score 58/80. 25  4. Patient will ascend and descend steps with reciprocal pattern to increase ease of movement in her home.  Evaluation:  Doing steps one at a time with use of a handrail.  Currrent: MET: reciprocal gait pattern with use of 1 handrail. 25         non-Medicare    RECOMMENDATIONS  Discontinue therapy. Progressing towards or have reached established goals.    If you have any questions/comments

## 2025-01-30 NOTE — PROGRESS NOTES
PHYSICAL / OCCUPATIONAL THERAPY - DAILY TREATMENT NOTE    Patient Name: Arti Sherman    Date: 2025    : 1945  Insurance: Payor: KENDRA RAMOS / Plan: [facility] [place] / Product Type: *No Product type* /      Patient  verified Yes     Visit #   Current / Total 9 24   Time   In / Out 852 930   Pain   In / Out 0 0   Subjective Functional Status/Changes: Patient states she is enjoying her knee karate class and does not feel her knee is limiting her with it.      TREATMENT AREA =  Left knee pain [M25.562]     OBJECTIVE        Therapeutic Procedures:      17018 Therapeutic Exercise (timed):  increase ROM, strength, coordination, balance, and proprioception to improve patient's ability to progress to PLOF and address remaining functional goals.   Tx Min Billable or 1:1 Min   (if diff from Tx Min) Details:   15   See flow sheet as applicable      19467 Neuromuscular Re-Education (timed):  improve balance, coordination, kinesthetic sense, posture, core stability and proprioception to improve patient's ability to develop conscious control of individual muscles and awareness of position of extremities in order to progress to PLOF and address remaining functional goals.   Tx Min Billable or 1:1 Min   (if diff from Tx Min) Details:   15   See flow sheet as applicable      97688 Therapeutic Activity (timed):  use of dynamic activities replicating functional movements to increase ROM, strength, coordination, balance, and proprioception in order to improve patient's ability to progress to PLOF and address remaining functional goals.   Tx Min Billable or 1:1 Min   (if diff from Tx Min) Details:   8   See flow sheet as applicable         38   [facility] Totals Reminder: bill using total billable min of TIMED therapeutic procedures (example: do not include dry needle or estim unattended, both untimed codes, in totals to left)  8-22 min = 1 unit; 23-37 min = 2 units; 38-52 min = 3 units; 53-67 min = 4 units; 68-82 min = 5 units 
Physical Therapy Discharge Instructions      [facility] - [address] [address] Ph: [phone] Fx: [phone]    Patient: Arti Sherman  : 1945      Continue Home Exercise Program 1 time per day       Continue with    [x] Ice  as needed   [x] Heat           Follow up with MD:     [] Upon completion of therapy     [x] As needed      Recommendations:     [x]   Return to activity with home program    []   Return to activity with the following modifications:       []Post Rehab Program    []Join Independent aquatic program     []Return to/join local gym      OBINNA GLASER, NAREN 2025 9:12 AM    
cardiac stent x 1